# Patient Record
Sex: MALE | Race: WHITE | NOT HISPANIC OR LATINO | Employment: OTHER | URBAN - METROPOLITAN AREA
[De-identification: names, ages, dates, MRNs, and addresses within clinical notes are randomized per-mention and may not be internally consistent; named-entity substitution may affect disease eponyms.]

---

## 2020-06-04 ENCOUNTER — COMMUNICATION - HEALTHEAST (OUTPATIENT)
Dept: FAMILY MEDICINE | Facility: CLINIC | Age: 55
End: 2020-06-04

## 2020-06-08 ENCOUNTER — OFFICE VISIT - HEALTHEAST (OUTPATIENT)
Dept: FAMILY MEDICINE | Facility: CLINIC | Age: 55
End: 2020-06-08

## 2020-06-08 DIAGNOSIS — M19.122 POST-TRAUMATIC OSTEOARTHRITIS OF LEFT ELBOW: ICD-10-CM

## 2020-06-08 DIAGNOSIS — Z01.818 PREOP GENERAL PHYSICAL EXAM: ICD-10-CM

## 2020-06-08 LAB
ATRIAL RATE - MUSE: 48 BPM
DIASTOLIC BLOOD PRESSURE - MUSE: NORMAL
INTERPRETATION ECG - MUSE: NORMAL
P AXIS - MUSE: 31 DEGREES
PR INTERVAL - MUSE: 140 MS
QRS DURATION - MUSE: 102 MS
QT - MUSE: 464 MS
QTC - MUSE: 414 MS
R AXIS - MUSE: -5 DEGREES
SYSTOLIC BLOOD PRESSURE - MUSE: NORMAL
T AXIS - MUSE: 28 DEGREES
VENTRICULAR RATE- MUSE: 48 BPM

## 2020-06-08 RX ORDER — GABAPENTIN 800 MG/1
800 TABLET ORAL
Status: SHIPPED | COMMUNITY
Start: 2020-01-16 | End: 2022-04-11

## 2020-06-08 ASSESSMENT — MIFFLIN-ST. JEOR: SCORE: 1723.73

## 2020-06-10 ENCOUNTER — COMMUNICATION - HEALTHEAST (OUTPATIENT)
Dept: SCHEDULING | Facility: CLINIC | Age: 55
End: 2020-06-10

## 2020-06-10 DIAGNOSIS — Z20.822 ENCOUNTER FOR LABORATORY TESTING FOR COVID-19 VIRUS: ICD-10-CM

## 2020-06-14 ENCOUNTER — AMBULATORY - HEALTHEAST (OUTPATIENT)
Dept: FAMILY MEDICINE | Facility: CLINIC | Age: 55
End: 2020-06-14

## 2020-06-14 DIAGNOSIS — Z20.822 ENCOUNTER FOR LABORATORY TESTING FOR COVID-19 VIRUS: ICD-10-CM

## 2020-06-15 ENCOUNTER — COMMUNICATION - HEALTHEAST (OUTPATIENT)
Dept: FAMILY MEDICINE | Facility: CLINIC | Age: 55
End: 2020-06-15

## 2021-01-15 ENCOUNTER — OFFICE VISIT - HEALTHEAST (OUTPATIENT)
Dept: FAMILY MEDICINE | Facility: CLINIC | Age: 56
End: 2021-01-15

## 2021-01-15 ENCOUNTER — COMMUNICATION - HEALTHEAST (OUTPATIENT)
Dept: TELEHEALTH | Facility: CLINIC | Age: 56
End: 2021-01-15

## 2021-01-15 DIAGNOSIS — K58.2 IRRITABLE BOWEL SYNDROME WITH BOTH CONSTIPATION AND DIARRHEA: ICD-10-CM

## 2021-01-15 DIAGNOSIS — F41.1 ANXIETY STATE: ICD-10-CM

## 2021-01-15 DIAGNOSIS — S61.409A OPEN WOUND OF HAND WITHOUT FOREIGN BODY, UNSPECIFIED LATERALITY, UNSPECIFIED WOUND TYPE, INITIAL ENCOUNTER: ICD-10-CM

## 2021-05-10 ENCOUNTER — OFFICE VISIT - HEALTHEAST (OUTPATIENT)
Dept: FAMILY MEDICINE | Facility: CLINIC | Age: 56
End: 2021-05-10

## 2021-05-10 DIAGNOSIS — H57.813 BROW PTOSIS, BILATERAL: ICD-10-CM

## 2021-05-10 DIAGNOSIS — Z01.818 PREOP EXAMINATION: ICD-10-CM

## 2021-05-10 DIAGNOSIS — F41.1 ANXIETY STATE: ICD-10-CM

## 2021-05-10 LAB
ANION GAP SERPL CALCULATED.3IONS-SCNC: 13 MMOL/L (ref 5–18)
BUN SERPL-MCNC: 11 MG/DL (ref 8–22)
CALCIUM SERPL-MCNC: 9.3 MG/DL (ref 8.5–10.5)
CHLORIDE BLD-SCNC: 102 MMOL/L (ref 98–107)
CO2 SERPL-SCNC: 24 MMOL/L (ref 22–31)
CREAT SERPL-MCNC: 1.01 MG/DL (ref 0.7–1.3)
GFR SERPL CREATININE-BSD FRML MDRD: >60 ML/MIN/1.73M2
GLUCOSE BLD-MCNC: 79 MG/DL (ref 70–125)
HGB BLD-MCNC: 14.8 G/DL (ref 14–18)
POTASSIUM BLD-SCNC: 4.4 MMOL/L (ref 3.5–5)
SODIUM SERPL-SCNC: 139 MMOL/L (ref 136–145)

## 2021-05-10 RX ORDER — PAROXETINE 20 MG/1
20 TABLET, FILM COATED ORAL
Status: SHIPPED | COMMUNITY
Start: 2021-04-14 | End: 2022-04-11

## 2021-05-10 ASSESSMENT — MIFFLIN-ST. JEOR: SCORE: 1797.66

## 2021-05-27 VITALS
WEIGHT: 209.1 LBS | HEIGHT: 71 IN | SYSTOLIC BLOOD PRESSURE: 128 MMHG | BODY MASS INDEX: 29.27 KG/M2 | HEART RATE: 67 BPM | OXYGEN SATURATION: 98 % | DIASTOLIC BLOOD PRESSURE: 90 MMHG

## 2021-05-29 ENCOUNTER — RECORDS - HEALTHEAST (OUTPATIENT)
Dept: ADMINISTRATIVE | Facility: CLINIC | Age: 56
End: 2021-05-29

## 2021-06-04 VITALS
SYSTOLIC BLOOD PRESSURE: 133 MMHG | TEMPERATURE: 97.9 F | BODY MASS INDEX: 26.99 KG/M2 | HEIGHT: 71 IN | DIASTOLIC BLOOD PRESSURE: 86 MMHG | WEIGHT: 192.8 LBS | HEART RATE: 57 BPM | OXYGEN SATURATION: 98 %

## 2021-06-05 VITALS
OXYGEN SATURATION: 97 % | BODY MASS INDEX: 28.6 KG/M2 | SYSTOLIC BLOOD PRESSURE: 126 MMHG | HEART RATE: 73 BPM | DIASTOLIC BLOOD PRESSURE: 82 MMHG | WEIGHT: 202.2 LBS

## 2021-06-08 NOTE — TELEPHONE ENCOUNTER
New Appointment Needed  What is the reason for the visit:    Pre-Op Appt Request  When is the surgery? :  6/17/20  Where is the surgery?:   [facility]  Who is the surgeon? :  Dr. Kolby Guajardo  What type of surgery is being done?: Left Elbow surgery  Provider Preference: Any available  How soon do you need to be seen?: As soon as possible  Waitlist offered?: No  Okay to leave a detailed message:  Yes, New Patient

## 2021-06-08 NOTE — PROGRESS NOTES
Preoperative Exam    Scheduled Procedure: Lt Elbow Surgery  Surgery Date:  6/17/20  Surgery Location: [facility]    Surgeon:  Dr. Kolby Guajardo    Assessment/Plan:     1. Preop general physical exam  - Electrocardiogram Perform and Read    2. Post-traumatic osteoarthritis of left elbow  Noted Bradycardia on ECG but he is asymptomatic.    Surgical Procedure Risk: Low (reported cardiac risk generally < 1%)  Have you had prior anesthesia?: Yes  Have you or any family members had a previous anesthesia reaction:  No  Do you or any family members have a history of a clotting or bleeding disorder?: No  Cardiac Risk Assessment: no increased risk for major cardiac complications. He is very active and does not have any CVD symptoms or history.    APPROVAL GIVEN to proceed with proposed procedure, without further diagnostic evaluation    Functional Status: Independent  Patient plans to recover at home with family.     Subjective:      Hamzah Bolanos is a 55 y.o. male who presents for a preoperative consultation.   New patient to me. Comes in for preop for Left Elbow Arthroscopic Surgery. Had an injury to the elbow joint remote past and has had several surgeries due to that.Currently having some pain on the joint and needs to have a debridement on the joint.  He has been very active. He takes medications for anxiety and is well controlled.   Today he does not have any concerns except for the mild discomfort on the elbow.      Review of Systems   Constitutional:Denied any fatigue no fevers no chills.  Has good appetite.  HEENT: Does not have any neck pain.  No difficulty swallowing denies having any postnasal drips.    Respiratory: There is no cough.  No chest wall pain.  Cardiovascular: Denied chest pain shortness of breath or palpitations.  There is no notable lower extremity swelling.    Gastrointestinal: Denies nausea vomiting.  No abdominal pain no diarrhea or constipation.  Endocrine:Has no sensitivity to  cold or heat.  Denied undue thirst.   Genitourinary:Has no urinary symptoms, no nocturia.  Musculoskeletal: There is mild musculoskeletal pain but no swelling.    Skin: He does not have any rashes.   Allergic/Immunologic: Negative.   Neurological: No Numbness  Hematological: Negative.   Psychiatric/Behavioral: No anxiety or depression symptoms.  All other systems reviewed and are negative, other than those listed in the HPI.    Pertinent History  Do you have difficulty breathing or chest pain after walking up a flight of stairs: No  History of obstructive sleep apnea: No  Steroid use in the last 6 months: Yes: Medrol Dosepak - finished 3 days ago  Frequent Aspirin/NSAID use: No  Prior Blood Transfusion: No  Prior Blood Transfusion Reaction: No  If for some reason prior to, during or after the procedure, if it is medically indicated, would you be willing to have a blood transfusion?:  There is no transfusion refusal.    Current Outpatient Medications   Medication Sig Dispense Refill     gabapentin (NEURONTIN) 800 MG tablet Take 800 mg by mouth.       nefazodone (SERZONE) 200 MG tablet TK 2 TS PO D       No current facility-administered medications for this visit.         No Known Allergies    Patient Active Problem List   Diagnosis     Anxiety state     Obsessive-compulsive disorder     Brow ptosis, bilateral     Snoring       Past Medical History:   Diagnosis Date     Anxiety        Past Surgical History:   Procedure Laterality Date     CARPAL TUNNEL RELEASE Bilateral      ELBOW SURGERY       JOINT REPLACEMENT      Both Knees Replaced.     RECONSTRUCTION OF NOSE       SPINE SURGERY         Social History     Socioeconomic History     Marital status:      Spouse name: Not on file     Number of children: Not on file     Years of education: Not on file     Highest education level: Not on file   Occupational History     Not on file   Social Needs     Financial resource strain: Not on file     Food insecurity      "Worry: Not on file     Inability: Not on file     Transportation needs     Medical: Not on file     Non-medical: Not on file   Tobacco Use     Smoking status: Never Smoker     Smokeless tobacco: Never Used   Substance and Sexual Activity     Alcohol use: Yes     Alcohol/week: 2.0 standard drinks     Types: 2 Cans of beer per week     Drug use: Never     Sexual activity: Yes     Partners: Female     Birth control/protection: Surgical   Lifestyle     Physical activity     Days per week: Not on file     Minutes per session: Not on file     Stress: Not on file   Relationships     Social connections     Talks on phone: Not on file     Gets together: Not on file     Attends Confucianist service: Not on file     Active member of club or organization: Not on file     Attends meetings of clubs or organizations: Not on file     Relationship status: Not on file     Intimate partner violence     Fear of current or ex partner: Not on file     Emotionally abused: Not on file     Physically abused: Not on file     Forced sexual activity: Not on file   Other Topics Concern     Not on file   Social History Narrative     Not on file       Patient Care Team:  Provider, No Primary Care as PCP - General          Objective:     Vitals:    06/08/20 1530   BP: 133/86   Pulse: (!) 57   Temp: 97.9  F (36.6  C)   SpO2: 98%   Weight: 192 lb 12.8 oz (87.5 kg)   Height: 5' 10.5\" (1.791 m)       Physical Exam:  General Appearance: Alert, cooperative, no distress, appears stated age  Head: Normocephalic, without obvious abnormality, atraumatic  Eyes: PERRL, conjunctiva/corneas clear, EOM's intact  Ears: Normal TM's and external ear canals, both ears  Nose: Nares normal, septum midline,mucosa normal, no drainage  Throat: Lips, mucosa, and tongue normal; teeth and gums normal  Neck: Supple, symmetrical, trachea midline, no adenopathy;  thyroid: not enlarged, symmetric, no tenderness.  Back: Symmetric, no curvature, ROM normal, no CVA tenderness  Lungs: " Clear to auscultation bilaterally, respirations unlabored  Heart: Regular rate and rhythm, S1 and S2 normal, no murmur, rub, or gallop,  Abdomen: Soft, non-tender, bowel sounds active all four quadrants,  no masses, no organomegaly  Musculoskeletal: Normal range of motion. No joint swelling or deformity. Multiple scars noted on the lateral aspect of the left elbow.  Extremities: Extremities normal, no cyanosis or edema  Skin: Skin color, texture, turgor normal, no rashes or lesions  Lymph nodes: Cervical, supraclavicular, and axillary nodes normal  Neurologic: He is alert. He has normal reflexes.   Psychiatric: He has a normal mood and affect.       Labs:  Recent Results (from the past 24 hour(s))   Electrocardiogram Perform and Read    Collection Time: 06/08/20  3:42 PM   Result Value Ref Range    SYSTOLIC BLOOD PRESSURE      DIASTOLIC BLOOD PRESSURE      VENTRICULAR RATE 48 BPM    ATRIAL RATE 48 BPM    P-R INTERVAL 140 ms    QRS DURATION 102 ms    Q-T INTERVAL 464 ms    QTC CALCULATION (BEZET) 414 ms    P Axis 31 degrees    R AXIS -5 degrees    T AXIS 28 degrees    MUSE DIAGNOSIS       Sinus bradycardia  Normal ECG  No previous ECGs available  Confirmed by INES RACHEL MD LOC:[facility] ([id number]) on 6/8/2020 4:19:02 PM         Immunization History   Administered Date(s) Administered     Influenza,seasonal quad, PF, =/> 6months 11/20/2019     Tdap 01/27/2012           Electronically signed by Koko Delvalle MD 06/08/20 3:33 PM

## 2021-06-08 NOTE — TELEPHONE ENCOUNTER
Ordered test ahead of patient visit.  Surg scheduled 6/17. Ok'd for timing by surgeon.  Elham Chavez MD

## 2021-06-14 NOTE — PROGRESS NOTES
"  Assessment & Plan     Open wound of hand without foreign body, unspecified laterality, unspecified wound type, initial encounter    He is a quite impressive looking wounds on top of either very large calluses or warts or something that is really unclear to me.  We will try some mupirocin for a bit to see if we can get it looking any better, but he may need to see dermatology so I like to see him again in a few weeks to make sure that he is getting better or certainly that is not getting any worse.  We also talked about using copious amounts of hand lotion during the day as his hands are terribly dry and more prone obviously to these types of wounds.      - mupirocin (BACTROBAN) 2 % ointment; Apply to affected area 3 times daily    Irritable bowel syndrome with both constipation and diarrhea    With his chronic state of what sounds like irritable bowel, I would prefer to send him to the GI doctors for evaluation and treatment options for him, so I gave them their number and he can give them a call and I put in a referral for him today.      - Ambulatory referral to Gastroenterology    Anxiety state    He clearly has obvious persistent anxiety and is on the medications as listed in the med list.  He sounds like he has some specialist that he works with on this so we will defer to them for treatment but we can evaluate him here for that as needed as well.      Review of external notes as documented above   Review of prior external note(s) from - psychiatry and others      Diagnosis or treatment significantly limited by social determinants of health - financial issues    [id number]}     BMI:   Estimated body mass index is 28.6 kg/m  as calculated from the following:    Height as of 6/8/20: 5' 10.5\" (1.791 m).    Weight as of this encounter: 202 lb 3.2 oz (91.7 kg).         No follow-ups on file.    Edmond Baez MD  [facility]     Hamzah Bolanos is 56 y.o. and presents to " clinic today for the following health issues   HPI     New patient was here today with multiple concerns.  He has a history of anxiety which overlays everything we talked about today.  He is on some Serzone and Viibryd for that.  He has a psychiatrist that he sees and we did review those copious amounts of notes today with the patient.    He has some cuts on his hands.  He is  by [employer] and he chronically does cut his hands but his hands are very dry and peeling as well as the fact that he has these huge calluses or other skin thickening in several areas and in the middle of those thickenings he has these wounds that are like old lacerations that just have not healed up nicely.  Not frankly infected but there is no drainage from them but they are not healing either.  He is wondering what he can do about that.    He also has some issues with his bowels that he has some alternating constipation and diarrhea and this becoming worse.  He is try to look at his diet and really has been a success with that and is wonders if he needs to see a specialist about that.    Review of Systems        Objective    /82 (Patient Site: Left Arm, Patient Position: Sitting, Cuff Size: Adult Regular)   Pulse 73   Wt 202 lb 3.2 oz (91.7 kg)   SpO2 97%   BMI 28.60 kg/m    Body mass index is 28.6 kg/m .  Physical Exam    Anxious appearing male in no acute distress.  Vital signs as noted.  Chest clear to auscultation.  Heart regular rate and rhythm.  Abdomen soft nontender nondistended bowel sounds present all quadrants.  He has the wounds of the hands as noted above.  He has an overall sense of dryness in his skin.

## 2021-06-16 PROBLEM — H57.813 BROW PTOSIS, BILATERAL: Status: ACTIVE | Noted: 2018-11-20

## 2021-06-17 NOTE — PROGRESS NOTES
[facility]  [address] [place] [id number]  Dept: [phone]  Dept Fax: [phone]  Primary Provider: Provider, [facility]  Pre-op Performing Provider: DON HERNANDEZ      PREOPERATIVE EVALUATION:  Today's date: 5/10/2021    Hamzah Bolanos is a 56 y.o. male who presents for a preoperative evaluation.    Surgical Information:  Surgery/Procedure: internal ptosis repair  Surgery Location: [facility]  Surgeon: Shanell  Surgery Date: 05-14-21  Time of Surgery: TBA  Where patient plans to recover: At home with family  Fax number for surgical facility: [phone]    Type of Anesthesia Anticipated: Local with MAC    Assessment & Plan      The proposed surgical procedure is considered LOW risk.    Preop examination    - Hemoglobin  - Basic Metabolic Panel    Brow ptosis, bilateral      Anxiety state           Risks and Recommendations:  The patient has the following additional risks and recommendations for perioperative complications:   - No identified additional risk factors other than previously addressed    Medication Instructions:  Patient is to take all scheduled medications on the day of surgery    RECOMMENDATION:  APPROVAL GIVEN to proceed with proposed procedure, without further diagnostic evaluation.    Review of prior external note(s) from - Outside records from [facility]  Review of the result(s) of each unique test - labs today  [id number]}    Subjective     HPI related to upcoming procedure: Patient here today for a preoperative consultation.  He is having a brow ptosis procedure done on his left side to recreate symmetry as well as to improve some visual field losses that he is having because it seems to be getting a bit worse.  This stems back to an accident from childhood and he is just getting to the point now he needs to have it repaired.    Preop Questions 5/10/2021   Have you ever had a heart attack or stroke? No   Have you ever had surgery on your heart or  blood vessels, such as a stent placement, a coronary artery bypass, or surgery on an artery in your head, neck, heart, or legs? No   Do you have chest pain with activity? No   Do you have a history of  heart failure? No   Do you currently have a cold, bronchitis or symptoms of other infection? No   Do you have a cough, shortness of breath, or wheezing? No   Do you or anyone in your family have previous history of blood clots? No   Do you or does anyone in your family have a serious bleeding problem such as prolonged bleeding following surgeries or cuts? No   Have you ever had problems with anemia or been told to take iron pills? No   Have you had any abnormal blood loss such as black, tarry or bloody stools? No   Have you ever had a blood transfusion? No   Are you willing to have a blood transfusion if it is medically needed before, during, or after your surgery? Yes   Have you or any of your relatives ever had problems with anesthesia? No   Do you have sleep apnea, excessive snoring or daytime drowsiness? No   Do you have any artifical heart valves or other implanted medical devices like a pacemaker, defibrillator, or continuous glucose monitor? No   Do you have artificial joints? YES - both knees   Are you allergic to latex? No     Health Care Directive:  Patient does not have a Health Care Directive or Living Will: Discussed advance care planning with patient; however, patient declined at this time.    Preoperative Review of :    reviewed - no record of controlled substances prescribed.    See problem list for active medical problems.  Problems all longstanding and stable, except as noted/documented.  See ROS for pertinent symptoms related to these conditions.      Review of Systems  CONSTITUTIONAL: NEGATIVE for fever, chills, change in weight  INTEGUMENTARY/SKIN: NEGATIVE for worrisome rashes, moles or lesions  EYES: NEGATIVE for vision changes or irritation  ENT/MOUTH: NEGATIVE for ear, mouth and throat  problems  RESP: NEGATIVE for significant cough or SOB  CV: NEGATIVE for chest pain, palpitations or peripheral edema  GI: NEGATIVE for nausea, abdominal pain, heartburn, or change in bowel habits  : NEGATIVE for frequency, dysuria, or hematuria  MUSCULOSKELETAL: NEGATIVE for significant arthralgias or myalgia  NEURO: NEGATIVE for weakness, dizziness or paresthesias  ENDOCRINE: NEGATIVE for temperature intolerance, skin/hair changes  HEME: NEGATIVE for bleeding problems  PSYCHIATRIC: NEGATIVE for changes in mood or affect    Patient Active Problem List    Diagnosis Date Noted     Brow ptosis, bilateral 11/20/2018     Anxiety state 02/28/2012     Snoring 01/27/2012     Obsessive-compulsive disorder 02/07/2007     Past Medical History:   Diagnosis Date     Anxiety      Past Surgical History:   Procedure Laterality Date     CARPAL TUNNEL RELEASE Bilateral      ELBOW SURGERY       JOINT REPLACEMENT      Both Knees Replaced.     RECONSTRUCTION OF NOSE       SPINE SURGERY       Current Outpatient Medications   Medication Sig Dispense Refill     gabapentin (NEURONTIN) 800 MG tablet Take 800 mg by mouth.       PARoxetine (PAXIL) 20 MG tablet Take 20 mg by mouth.       vilazodone (VIIBRYD) 10 mg Tab Take 1 tab daily with breakfast meal for 1 week and then 2 tabs daily thereafter.       nefazodone (SERZONE) 200 MG tablet TK 2 TS PO D       No current facility-administered medications for this visit.        No Known Allergies    Social History     Tobacco Use     Smoking status: Never Smoker     Smokeless tobacco: Never Used   Substance Use Topics     Alcohol use: Yes     Alcohol/week: 2.0 standard drinks     Types: 2 Cans of beer per week      Family History   Problem Relation Age of Onset     COPD Mother      COPD Father      Social History     Substance and Sexual Activity   Drug Use Never        Objective     There were no vitals taken for this visit.  Physical Exam  GENERAL APPEARANCE: healthy, alert and no  distress  HENT: ear canals and TM's normal and nose and mouth without ulcers or lesions  RESP: lungs clear to auscultation - no rales, rhonchi or wheezes  CV: regular rate and rhythm, normal S1 S2, no S3 or S4 and no murmur, click or rub  ABDOMEN: soft, nontender, no HSM or masses and bowel sounds normal  NEURO: Normal strength and tone, sensory exam grossly normal, mentation intact and speech normal    No results for input(s): HGB, PLT, INR, NA, K, CREATININE, HBA1C in the last 64331 hours.     PRE-OP Diagnostics:   Labs pending at this time. Results will be reviewed when available.  No EKG required for low risk surgery (cataract, skin procedure, breast biopsy, etc).    REVISED CARDIAC RISK INDEX (RCRI)   The patient has the following serious cardiovascular risks for perioperative complications:   - No serious cardiac risks = 0 points    RCRI INTERPRETATION: 0 points: Class I (very low risk - 0.4% complication rate)         Signed Electronically by: Edmond Baez MD    Copy of this evaluation report is provided to requesting physician.

## 2021-06-20 NOTE — LETTER
Letter by Shazia Alvarado RN at      Author: Shazia Alvarado RN Service: -- Author Type: --    Filed:  Encounter Date: 6/15/2020 Status: (Other)       6/15/2020        Hamzah Bolanos  [address]  [place] [address]    This letter provides a written record that you were tested for COVID-19 on 6/14/20.     Your result was negative.    This means that we didnt find the virus that causes COVID-19 in your sample. A test may show negative when you do actually have the virus. This can happen when the virus is in the early stages of infection, before you feel illness symptoms.    Even if you dont have symptoms, they may still appear. For safety, its very important to follow these rules.    Keep yourself away from others (self-isolation):      Stay home. Dont go to work, school or anywhere else.     Stay in your own room (and use your own bathroom), if you can.    Stay away from others in your home. No hugging, kissing or shaking hands. No visitors.    Clean high touch surfaces often (doorknobs, counters, handles, etc.). Use a household cleaning spray or wipes.    Cover your mouth and nose with a mask, tissue or washcloth to avoid spreading germs.    Wash your hands and face often with soap and water.    Stay in self-isolation until you meet ALL of the guidelines below:    1. You have had no fever for at least 72 hours (that is 3 full days of no fever without the use of medicine that reduces fevers), AND  2. other symptoms (such as cough, shortness of breath) have gotten better, AND  3. at least 10 days have passed since your symptoms first appeared.    Going back to work  Check with your employer for any guidelines to follow for going back to work.    Employers: This document serves as formal notice that your employee tested negative for COVID-19, as of the testing date shown above.    For questions regarding this letter or your Negative COVID-19 result, call [phone] between 8A to 6:30P (M-F) and 10A  to 6:30P (weekends).

## 2021-07-03 NOTE — ADDENDUM NOTE
Addendum Note by Elham Chavez MD at 6/12/2020 11:26 AM     Author: Elham Chavez MD Service: -- Author Type: Physician    Filed: 6/12/2020 11:26 AM Encounter Date: 6/10/2020 Status: Signed    : Elham Chavez MD (Physician)    Addended by: ELHAM CHAVEZ on: 6/12/2020 11:26 AM        Modules accepted: Orders

## 2022-01-25 ENCOUNTER — IMMUNIZATION (OUTPATIENT)
Dept: NURSING | Facility: CLINIC | Age: 57
End: 2022-01-25
Payer: COMMERCIAL

## 2022-01-25 PROCEDURE — 90682 RIV4 VACC RECOMBINANT DNA IM: CPT

## 2022-01-25 PROCEDURE — 91306 COVID-19,PF,MODERNA (18+ YRS BOOSTER .25ML): CPT

## 2022-01-25 PROCEDURE — 0064A COVID-19,PF,MODERNA (18+ YRS BOOSTER .25ML): CPT

## 2022-01-25 PROCEDURE — 90471 IMMUNIZATION ADMIN: CPT

## 2022-01-27 ENCOUNTER — TELEPHONE (OUTPATIENT)
Dept: SURGERY | Facility: CLINIC | Age: 57
End: 2022-01-27
Payer: COMMERCIAL

## 2022-04-11 ENCOUNTER — OFFICE VISIT (OUTPATIENT)
Dept: FAMILY MEDICINE | Facility: CLINIC | Age: 57
End: 2022-04-11
Payer: COMMERCIAL

## 2022-04-11 VITALS
DIASTOLIC BLOOD PRESSURE: 88 MMHG | SYSTOLIC BLOOD PRESSURE: 122 MMHG | BODY MASS INDEX: 28.94 KG/M2 | WEIGHT: 204.56 LBS | HEART RATE: 68 BPM

## 2022-04-11 DIAGNOSIS — S20.211D CONTUSION OF RIB ON RIGHT SIDE, SUBSEQUENT ENCOUNTER: Primary | ICD-10-CM

## 2022-04-11 PROCEDURE — 99213 OFFICE O/P EST LOW 20 MIN: CPT | Performed by: FAMILY MEDICINE

## 2022-04-11 RX ORDER — GABAPENTIN 300 MG/1
900 CAPSULE ORAL
COMMUNITY
Start: 2021-12-28

## 2022-04-11 NOTE — PROGRESS NOTES
"  Assessment & Plan     Contusion of rib on right side, subsequent encounter    I reassured him that I do not think he needs a further x-ray at this point.  I think that he just has a significant contusion and he should start to get better over the next couple of weeks.  If he is continuing to have persistent symptoms or particularly if he gets worse with worsening breathing or cough productive of a sputum or blood or fever then obviously he will let us know.      Review of external notes as documented elsewhere in note         BMI:   Estimated body mass index is 28.94 kg/m  as calculated from the following:    Height as of 5/10/21: 1.791 m (5' 10.5\").    Weight as of this encounter: 92.8 kg (204 lb 9 oz).           No follow-ups on file.    Edmond Baez MD  [facility]    Esperanza Goodson is a 57 year old who presents for the following health issues     HPI     Patient is here for continued rib pain on the right side.  He states that he fell on the ice about 3 weeks ago.  It was a pretty hard fall onto a sharp edge and he was seen in the emergency room and they did an x-ray which was negative for fracture.  He thought things were getting better but then it was a little bit worse recently and he thought he should come in and get checked.  The bruising has resolved.  He has no shortness of breath, no cough, no hemoptysis, no fevers or chills.  He is having a hard time sleeping on that side at times.      Review of Systems   Constitutional, HEENT, cardiovascular, pulmonary, gi and gu systems are negative, except as otherwise noted.      Objective    /88   Pulse 68   Wt 92.8 kg (204 lb 9 oz)   BMI 28.94 kg/m    Body mass index is 28.94 kg/m .  Physical Exam   Well-appearing male in no acute distress.  Vital signs as noted.  Chest clear to auscultation all fields.  Heart regular rate and rhythm.  Squeeze test on the rib cage is negative.  He has no step-off deformity but there " is certainly some tenderness in that midaxillary line on that right side.

## 2022-04-13 ENCOUNTER — TRANSFERRED RECORDS (OUTPATIENT)
Dept: HEALTH INFORMATION MANAGEMENT | Facility: CLINIC | Age: 57
End: 2022-04-13
Payer: COMMERCIAL

## 2022-04-20 ENCOUNTER — TELEPHONE (OUTPATIENT)
Dept: FAMILY MEDICINE | Facility: CLINIC | Age: 57
End: 2022-04-20
Payer: COMMERCIAL

## 2022-04-20 DIAGNOSIS — R06.83 SNORING: Primary | ICD-10-CM

## 2022-04-20 NOTE — TELEPHONE ENCOUNTER
Reason for Call: Request for an order or referral:    Order or referral being requested:     Need sleep study referral    Date needed: as soon as possible    Has the patient been seen by the PCP for this problem? YES    Additional comments:     Pt saw Dr. Dillon ENT specialist. Dr. Dillon would like for pt to get a sleep study done. Pt needs a referral. Please call pt when referral is put in.    Phone number Patient can be reached at:  Cell number on file:    Telephone Information:   Mobile [phone]       Best Time:  anytime    Can we leave a detailed message on this number?  YES    Call taken on 4/20/2022 at 12:09 PM by Batsheva Priest

## 2024-05-31 ENCOUNTER — OFFICE VISIT (OUTPATIENT)
Dept: FAMILY MEDICINE | Facility: CLINIC | Age: 59
End: 2024-05-31
Payer: COMMERCIAL

## 2024-05-31 ENCOUNTER — NURSE TRIAGE (OUTPATIENT)
Dept: NURSING | Facility: CLINIC | Age: 59
End: 2024-05-31
Payer: COMMERCIAL

## 2024-05-31 VITALS
TEMPERATURE: 98.4 F | HEART RATE: 59 BPM | OXYGEN SATURATION: 96 % | RESPIRATION RATE: 16 BRPM | DIASTOLIC BLOOD PRESSURE: 81 MMHG | SYSTOLIC BLOOD PRESSURE: 132 MMHG

## 2024-05-31 DIAGNOSIS — S20.362A TICK BITE OF LEFT FRONT WALL OF THORAX, INITIAL ENCOUNTER: Primary | ICD-10-CM
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
## 2024-05-31 PROCEDURE — 99213 OFFICE O/P EST LOW 20 MIN: CPT | Performed by: PHYSICIAN ASSISTANT

## 2024-05-31 RX ORDER — DOXYCYCLINE 100 MG/1
100 TABLET ORAL 2 TIMES DAILY
Qty: 20 TABLET | Refills: 0 | Status: SHIPPED | OUTPATIENT
Start: 2024-05-31 | End: 2024-06-10

## 2024-05-31 NOTE — TELEPHONE ENCOUNTER
"Nurse Triage SBAR    Is this a 2nd Level Triage? NO    Situation: Tick bite one week ago and feeling \"off\"  Consent: not needed    Background: one week ago- was attached on his chest    Assessment:   tick bit times one week ago   Chest- redness and itching   Feels \"out of sorts\"  Indicates that the tick was very small  Has been applying bacitracin   Uncertain how long tick was attached- was flat when it was pulled  Notes he feel more off balance  Indicates he has had headaches more since the tick bite- pressure- taking tylenol      Protocol Recommended Disposition:       Recommendation: Advised to be seen in clinic- reviewed additional care advice with patient and he verbalizes understanding. No appointments at [facility] or [facility], patient will go to [facility] today     [facility]     Does the patient meet one of the following criteria for ADS visit consideration? No      Lu Lentz RN 12:34 PM 5/31/2024  Reason for Disposition   Patient wants to be seen    Additional Information   Negative: Not a tick bite   Negative: Patient sounds very sick or weak to the triager   Negative: Fever or severe headache occurs, 2 to 14 days following the bite   Negative: Widespread rash occurs, 2 to 14 days following the bite   Negative: Can't remove live tick (after using Care Advice)   Negative: Fever and spreading red area or streak   Negative: Fever and area is very tender to touch   Negative: Red streak or red line and length > 2 inches (5 cm)   Negative: Red or very tender (to touch) area and started over 24 hours after the bite   Negative: Red ring or bull's-eye rash occurs around a deer tick bite   Negative: Probable deer tick that was attached > 36 hours (or tick appears swollen, not flat)    Protocols used: Tick Bite-A-OH    "

## 2024-05-31 NOTE — PATIENT INSTRUCTIONS
Return for persistent fevers, rash, joint swelling.    You may use hydrocortisone to the area for itching.

## 2024-05-31 NOTE — PROGRESS NOTES
Assessment & Plan     Hamzah was seen today for tick bite.    Diagnoses and all orders for this visit:    Tick bite of left front wall of thorax, initial encounter  -     doxycycline monohydrate (ADOXA) 100 MG tablet; Take 1 tablet (100 mg) by mouth 2 times daily for 10 days  Discussed with pt he does not meet criteria for prophylaxis given 1 week from tick bite, but given erythema, induration and worsening erythema, considered secondary infection. Will cover with doxycycline.  If fevers, new rashes, unexplained joint pain or swelling should RTC.  Pt agreeable with plan. May use topical steroid for pruritus.     - doxycycline monohydrate (ADOXA) 100 MG tablet  Dispense: 20 tablet; Refill: 0       Kathy Levin PA-C  [facility]    Esperanza Goodson is a 59 year old male who presents to clinic today for the following health issues:  Chief Complaint   Patient presents with    Tick Bite     Removed tick upper left chest 1 week ago area red itches     HPI    Small tick, suspects it is a deer tick ,removed from the L chest about 1 week ago. Unclear how long it had been in place.  No fevers or joint swelling.  Redness not improving, ? Worsening.          Review of Systems  Constitutional, HEENT, cardiovascular, pulmonary, gi and gu systems are negative, except as otherwise noted.      Objective    /81   Pulse 59   Temp 98.4  F (36.9  C) (Oral)   Resp 16   SpO2 96%   Physical Exam   Nad appears well  Exam of the skin reveals a 2 cm area of erythema with central puncta L chest wall.  No central clearing or bullseye but is indurated and TTP.    No results found for any visits on 05/31/24.

## 2024-11-21 NOTE — TELEPHONE ENCOUNTER
Patient calls today asking about getting his pre-op COVID test for his surgery on 6/17/20. His surgery is a left elbow surgery and will be done at [facility]. Patient does not have order for COVID test so I was unable to schedule his pre-op test at this time.    I see patient had his general pre-op appt on 6/8/20 with Dr. Delvalle. I am wondering if Dr. Delvalle would be able to place the order for patient's pre-op COVID test? The test should be done on 6/14 since the surgery is 6/17. If Dr. Delvalle could place that order I will call back the patient this afternoon to schedule the COVID test. Thanks!    Amber Wheeler RN            
Patient is to call to the surgeon to put in an order for the preop COVID testing.  I do not think that we the ones that put in the order if the surgeon put in an order he can get it done at [facility] or any of the [facility] places.  But they need to order it.  
Scheduled patient for Covid Testing at [facility]. Patient will hand carry order. Lay Guzmán  
2.47

## 2025-09-02 ENCOUNTER — RESULTS FOLLOW-UP (OUTPATIENT)
Dept: FAMILY MEDICINE | Facility: CLINIC | Age: 60
End: 2025-09-02

## 2025-09-02 ENCOUNTER — OFFICE VISIT (OUTPATIENT)
Dept: FAMILY MEDICINE | Facility: CLINIC | Age: 60
End: 2025-09-02
Payer: COMMERCIAL

## 2025-09-02 VITALS
RESPIRATION RATE: 18 BRPM | HEART RATE: 57 BPM | SYSTOLIC BLOOD PRESSURE: 113 MMHG | DIASTOLIC BLOOD PRESSURE: 76 MMHG | HEIGHT: 72 IN | TEMPERATURE: 97.9 F | OXYGEN SATURATION: 100 % | BODY MASS INDEX: 27.1 KG/M2 | WEIGHT: 200.1 LBS

## 2025-09-02 DIAGNOSIS — Z23 NEED FOR VACCINATION: ICD-10-CM

## 2025-09-02 DIAGNOSIS — G47.33 OSA (OBSTRUCTIVE SLEEP APNEA): ICD-10-CM

## 2025-09-02 DIAGNOSIS — G47.10 EXCESSIVE SLEEPINESS: Primary | ICD-10-CM

## 2025-09-02 LAB
ERYTHROCYTE [DISTWIDTH] IN BLOOD BY AUTOMATED COUNT: 12.8 % (ref 10–15)
HCT VFR BLD AUTO: 42.2 % (ref 40–53)
HGB BLD-MCNC: 14.1 G/DL (ref 13.3–17.7)
MCH RBC QN AUTO: 31.4 PG (ref 26.5–33)
MCHC RBC AUTO-ENTMCNC: 33.4 G/DL (ref 31.5–36.5)
MCV RBC AUTO: 94 FL (ref 78–100)
PLATELET # BLD AUTO: 229 10E3/UL (ref 150–450)
RBC # BLD AUTO: 4.49 10E6/UL (ref 4.4–5.9)
WBC # BLD AUTO: 4.68 10E3/UL (ref 4–11)

## 2025-09-02 PROCEDURE — 85027 COMPLETE CBC AUTOMATED: CPT | Performed by: FAMILY MEDICINE

## 2025-09-02 PROCEDURE — 36415 COLL VENOUS BLD VENIPUNCTURE: CPT | Performed by: FAMILY MEDICINE

## 2025-09-03 ENCOUNTER — PATIENT OUTREACH (OUTPATIENT)
Dept: CARE COORDINATION | Facility: CLINIC | Age: 60
End: 2025-09-03
Payer: COMMERCIAL